# Patient Record
Sex: MALE | Race: WHITE | URBAN - METROPOLITAN AREA
[De-identification: names, ages, dates, MRNs, and addresses within clinical notes are randomized per-mention and may not be internally consistent; named-entity substitution may affect disease eponyms.]

---

## 2019-02-28 ENCOUNTER — TELEPHONE (OUTPATIENT)
Dept: CONSULT | Facility: CLINIC | Age: 24
End: 2019-02-28

## 2019-02-28 NOTE — TELEPHONE ENCOUNTER
I received a telephone call from Evan to be seen for genetic counseling and carrier screening for cystic fibrosis.  This was requested due to his wife Namrata's history of CF.  A visit was scheduled at his convenience and he was encouraged to call back with additional questions or concerns in the meantime.        Kayleen Wesley MS, [facility]  Genetic Counselor  [facility]  [facility]

## 2019-03-19 ENCOUNTER — TELEPHONE (OUTPATIENT)
Dept: PULMONOLOGY | Facility: CLINIC | Age: 24
End: 2019-03-19

## 2019-03-19 NOTE — TELEPHONE ENCOUNTER
I received a telephone call from Evan's wife Namrata regarding his upcoming genetic counseling appointment.  Her Evan's insurance company, a referral from his PCP is needed in order for the visit to be covered.  He will plan to obtain this prior to proceeding.  His upcoming appointment for 3/22 will therefore be cancelled.  The family is encouraged to call us back to reschedule when the referral is received.  We remain available.       Kayleen Wesley MS, [facility]  Genetic Counselor  [facility]  [facility]

## 2019-03-27 ENCOUNTER — MEDICAL CORRESPONDENCE (OUTPATIENT)
Dept: HEALTH INFORMATION MANAGEMENT | Facility: CLINIC | Age: 24
End: 2019-03-27

## 2019-04-30 ENCOUNTER — TELEPHONE (OUTPATIENT)
Dept: PULMONOLOGY | Facility: CLINIC | Age: 24
End: 2019-04-30

## 2019-04-30 NOTE — TELEPHONE ENCOUNTER
I returned a telephone call from Evan regarding his desire to pursue cystic fibrosis carrier screening.  A genetic counseling appointment was scheduled at his convenience. We remain available for additional questions or concerns in the meantime.        Kayleen Wesley MS [facility]  Genetic Counselor  Division of Genetics and Metabolism

## 2019-06-04 ENCOUNTER — OFFICE VISIT (OUTPATIENT)
Dept: PULMONOLOGY | Facility: CLINIC | Age: 24
End: 2019-06-04
Attending: GENETIC COUNSELOR, MS
Payer: COMMERCIAL

## 2019-06-04 DIAGNOSIS — Z31.5 ENCOUNTER FOR PROCREATIVE GENETIC COUNSELING: ICD-10-CM

## 2019-06-04 DIAGNOSIS — Z13.71 SCREENING FOR GENETIC DISEASE CARRIER STATUS: Primary | ICD-10-CM

## 2019-06-04 DIAGNOSIS — Z83.49 FAMILY HISTORY OF CYSTIC FIBROSIS: ICD-10-CM

## 2019-06-04 PROCEDURE — 96040 ZZH GENETIC COUNSELING, EACH 30 MINUTES: CPT | Mod: ZF | Performed by: GENETIC COUNSELOR, MS

## 2019-06-04 PROCEDURE — 36415 COLL VENOUS BLD VENIPUNCTURE: CPT | Performed by: INTERNAL MEDICINE

## 2019-06-04 NOTE — LETTER
6/4/2019      RE: Evan Mendiola  [address]  [facility] [address]       Presenting Information:  Evan Mendiola is a healthy 24-year-old man whose wife Namrata has cystic fibrosis.  Evan was referred for genetic counseling by his primary care provider Dr. Surjit Orellana, to discuss and pursue cystic fibrosis carrier screening.  During our visit a family history was collected, the genetics and inheritance of cystic fibrosis were reviewed, the options for carrier screening were discussed, and informed consent for genetic testing was obtained.     Family History:  A detailed pedigree was obtained and scanned into the electronic medical record.  It is significant for the following:     Evan himself reports being healthy.    Evan currently has no children by choice.    Evan has one healthy sister.      Both of Evan's parents are in their 60s and healthy.      Evan reports no known family history of cystic fibrosis or other known genetic conditions.    Evan's wife Namrata is 27-years-old.  She has cystic fibrosis and is homozygous for the common [id number] mutation.    Evan is of [place], [place], and Eastern  ancestry.  Namrata is of Northern  ancestry.  Consanguinity was denied.     Discussion:  As Evan knows, genes are long stretches of DNA that are responsible for how our bodies look and how our bodies work.  We all have two copies of every gene; one inherited from the mother and one inherited from the father.  When there is a change, called a mutation, in a gene it can cause it to not do its job correctly which can cause the signs and symptoms of a genetic condition.  Cystic fibosis is caused by mutations in a gene called CFTR.       Cystic fibrosis (CF) is inherited in an autosomal recessive pattern.  This means that to be affected an individual must inherit a mutation in both copies of the CFTR gene (one from each parent).  Individuals with just one mutation in the CFTR gene are said to be  carriers.  Carriers do not have cystic fibrosis but can have an affected child if their partner is also a carrier.  Because both copies of Evan's wife Namrata's CFTR gene have a mutation, any biological child she has would be a carrier for CF.       Approximately 1/25 individuals of  ancestry is a carrier for cystic fibrosis.  Therefore the couple's a priori risk to have a child with cystic fibrosis is approximately 1/50 (2%).  If Evan is found to be a carrier, the chance for a child to have cystic fibrosis is 50%.  If Evan is not found to be a carrier the chance is significantly reduced, much less than 1%.     If Evan is found to be a carrier for cystic fibrosis, an additional option that would be available to the couple is pre-implantation genetic diagnosis (PGD).  PGD uses in vitro fertilization to create very early embryos outside of the body.  The early embryos are then tested for cystic fibrosis and only the unaffected embryos implanted into the mother's uterus.  Alternatively, the couple could also have testing during a pregnancy (through chorionic villus sampling or amniocentesis), or wait until the baby is born to have testing for CF.  With each of these options there are a number of emotional, ethical, and financial issues that each couple needs to explore for themselves.  We can discuss these options in more detail if needed following carrier screening.       We reviewed DNA testing options available that can help determine if an individual is a carrier of cystic fibrosis. This includes testing of the CFTR gene only, or more comprehensive carrier testing for a large panel of conditions including cystic fibrosis.  We reviewed the costs, limitations, and benefits of each of these options and Evan opted to proceed with the more comprehensive carrier panel.  Written informed consent for testing was obtained from Evan.  We also discussed insurance coverage for testing and the performing lab,  [facility], will check Evan's insurance coverage and contact the couple with the expected out of pocket cost along with the option for self-pay.       I will contact Evan once these test results have returned, which are expected in approximately two to three weeks. If Evan is found to be a carrier for a CFTR mutation, we can meet again to discuss reproductive options in more detail.  If he is found to be a carrier for another condition we can discuss the option of pursuing testing for his wife Namrata.  I encouraged the couple to contact me at [phone] or [phone] with any additional questions or concerns.      Plan:  1.  Comprehensive carrier screen including the CFTR gene to [facility] lab  2.  Results are expected in approximately 2-3 weeks; I will contact Nona when results return  3.  Follow-up as determined by results of the above testing      Kayleen Wesley [facility]  Genetic Counselor  Division of Genetics and Metabolism    Total time spent in consultation with the patient was approximately 30 minutes    Cc: No letter    Kayleen Wesley GC

## 2019-06-04 NOTE — LETTER
Date:June 6, 2019      Provider requested that no letter be sent. Do not send.       [facility] Information

## 2019-06-05 LAB — MISCELLANEOUS TEST: NORMAL

## 2019-06-05 NOTE — PROGRESS NOTES
Presenting Information:  Evan Mendiola is a healthy 24-year-old man whose wife Namrata has cystic fibrosis.  Evan was referred for genetic counseling by his primary care provider Dr. Surjit Orellana, to discuss and pursue cystic fibrosis carrier screening.  During our visit a family history was collected, the genetics and inheritance of cystic fibrosis were reviewed, the options for carrier screening were discussed, and informed consent for genetic testing was obtained.     Family History:  A detailed pedigree was obtained and scanned into the electronic medical record.  It is significant for the following:     Evan himself reports being healthy.    Evan currently has no children by choice.    Evan has one healthy sister.      Both of Evan's parents are in their 60s and healthy.      Evan reports no known family history of cystic fibrosis or other known genetic conditions.    Evan's wife Namrata is 27-years-old.  She has cystic fibrosis and is homozygous for the common [id number] mutation.    Evan is of Stateless, Polish, and Eastern  ancestry.  Namrata is of Northern  ancestry.  Consanguinity was denied.     Discussion:  As Evan knows, genes are long stretches of DNA that are responsible for how our bodies look and how our bodies work.  We all have two copies of every gene; one inherited from the mother and one inherited from the father.  When there is a change, called a mutation, in a gene it can cause it to not do its job correctly which can cause the signs and symptoms of a genetic condition.  Cystic fibosis is caused by mutations in a gene called CFTR.       Cystic fibrosis (CF) is inherited in an autosomal recessive pattern.  This means that to be affected an individual must inherit a mutation in both copies of the CFTR gene (one from each parent).  Individuals with just one mutation in the CFTR gene are said to be carriers.  Carriers do not have cystic fibrosis but can have an affected child  if their partner is also a carrier.  Because both copies of Evan's wife Namrata's CFTR gene have a mutation, any biological child she has would be a carrier for CF.       Approximately 1/25 individuals of  ancestry is a carrier for cystic fibrosis.  Therefore the couple's a priori risk to have a child with cystic fibrosis is approximately 1/50 (2%).  If Evan is found to be a carrier, the chance for a child to have cystic fibrosis is 50%.  If Evan is not found to be a carrier the chance is significantly reduced, much less than 1%.     If Evan is found to be a carrier for cystic fibrosis, an additional option that would be available to the couple is pre-implantation genetic diagnosis (PGD).  PGD uses in vitro fertilization to create very early embryos outside of the body.  The early embryos are then tested for cystic fibrosis and only the unaffected embryos implanted into the mother's uterus.  Alternatively, the couple could also have testing during a pregnancy (through chorionic villus sampling or amniocentesis), or wait until the baby is born to have testing for CF.  With each of these options there are a number of emotional, ethical, and financial issues that each couple needs to explore for themselves.  We can discuss these options in more detail if needed following carrier screening.       We reviewed DNA testing options available that can help determine if an individual is a carrier of cystic fibrosis. This includes testing of the CFTR gene only, or more comprehensive carrier testing for a large panel of conditions including cystic fibrosis.  We reviewed the costs, limitations, and benefits of each of these options and Evan opted to proceed with the more comprehensive carrier panel.  Written informed consent for testing was obtained from Evan.  We also discussed insurance coverage for testing and the performing lab, [facility], will check Evan's insurance coverage and contact the couple with  the expected out of pocket cost along with the option for self-pay.       I will contact Evan once these test results have returned, which are expected in approximately two to three weeks. If Evan is found to be a carrier for a CFTR mutation, we can meet again to discuss reproductive options in more detail.  If he is found to be a carrier for another condition we can discuss the option of pursuing testing for his wife Namrata.  I encouraged the couple to contact me at [phone] or [phone] with any additional questions or concerns.      Plan:  1.  Comprehensive carrier screen including the CFTR gene to [facility] lab  2.  Results are expected in approximately 2-3 weeks; I will contact Nona when results return  3.  Follow-up as determined by results of the above testing      Kayleen Wesley MS [facility]  Genetic Counselor  Division of Genetics and Metabolism    Total time spent in consultation with the patient was approximately 30 minutes    Cc: No letter

## 2019-06-17 ENCOUNTER — TELEPHONE (OUTPATIENT)
Dept: PULMONOLOGY | Facility: CLINIC | Age: 24
End: 2019-06-17

## 2019-06-17 NOTE — TELEPHONE ENCOUNTER
I contacted Evan to discuss the results of his recent carrier screening.  This included screening for a large panel of genes including cystic fibrosis.  This has returned negative (normal), significantly reducing, but not eliminating, the chance for Evan to be a carrier for CF or any of the other tested conditions.  This decreases the chance for Evan and his wife Namrata to have a child with CF from approximately 1/50 to approximately 1/5400.  Evan was unavailable and a voicemail was left asking that he return my call at his convenience.      Kayleen Wesley MS, [facility]  Genetic Counselor  [facility]  [facility]

## 2019-06-18 LAB — LAB SCANNED RESULT: NORMAL
